# Patient Record
Sex: MALE
[De-identification: names, ages, dates, MRNs, and addresses within clinical notes are randomized per-mention and may not be internally consistent; named-entity substitution may affect disease eponyms.]

---

## 2017-08-18 ENCOUNTER — HOSPITAL ENCOUNTER (OUTPATIENT)
Dept: HOSPITAL 47 - RADUSWWP | Age: 32
Discharge: HOME | End: 2017-08-18
Attending: FAMILY MEDICINE
Payer: COMMERCIAL

## 2017-08-18 DIAGNOSIS — N20.0: Primary | ICD-10-CM

## 2017-08-18 DIAGNOSIS — N28.1: ICD-10-CM

## 2017-08-18 PROCEDURE — 76700 US EXAM ABDOM COMPLETE: CPT

## 2017-08-18 NOTE — US
EXAMINATION TYPE: US abdomen complete

 

DATE OF EXAM: 8/18/2017

 

COMPARISON: NONE

 

CLINICAL HISTORY: R74.8 ABN LIVER ENZYMES. Elevated liver enzymes

 

EXAM MEASUREMENTS:

 

Liver Length:  15.9 cm   

Gallbladder Wall:  0.2 cm   

CBD:  0.3 cm

Spleen:  12.3 cm   

Right Kidney:  11.7 x 6.0 x 5.5 cm 

Left Kidney:  11.6 x 5.8 x 5.5 cm   

 

 

 

Pancreas:  obscured by overlying midline bowel gas

Liver:  somewhat heterogeneous echogenicity, increased attenuation, scanned intercostally, limited by
 rib shadowing. This limits evaluation for underlying hepatic masses. 

Gallbladder:  wnl

**Evidence for sonographic Brink's sign:  no

CBD:  visualized portions wnl, limited by overlying bowel gas 

Spleen:  wnl   

Right Kidney:  2.7 x 2.3 x 1.8cm septated cystic area superior pole    

Left Kidney:  0.5cm echogenic focus inferior pole   

Upper IVC:  wnl  

Abd Aorta:  visualized portions wnl, proximal portion obscured by overlying midline bowel gas

 

The intrahepatic portion of the IVC and proximal abdominal aorta are within normal limits.  There is 
no evidence of cholelithiasis.  Common bile duct is unremarkable.  The visualized portions of the pan
creas are homogenous.  The spleen is unremarkable.  Kidneys are symmetric and free of hydronephrosis.
  No renal lesions are seen.

 

IMPRESSION: 

1. Heterogenous echogenicity of the liver, most commonly related to underlying hepatic steatosis.

2. 5 mm nonobstructing left lower pole renal calculus.

3. 2.7 cm right upper pole renal cyst containing an internal septation.

## 2017-11-17 ENCOUNTER — HOSPITAL ENCOUNTER (OUTPATIENT)
Dept: HOSPITAL 47 - LABWHC1 | Age: 32
Discharge: HOME | End: 2017-11-17
Attending: FAMILY MEDICINE
Payer: COMMERCIAL

## 2017-11-17 DIAGNOSIS — K76.0: Primary | ICD-10-CM

## 2017-11-17 LAB
ALP SERPL-CCNC: 74 U/L (ref 38–126)
ALT SERPL-CCNC: 68 U/L (ref 21–72)
AST SERPL-CCNC: 31 U/L (ref 17–59)
BILIRUBIN DIRECT+TOT PNL SERPL-MCNC: 0.3 MG/DL (ref 0–0.2)
PROT SERPL-MCNC: 7.3 G/DL (ref 6.3–8.2)

## 2017-11-17 PROCEDURE — 36415 COLL VENOUS BLD VENIPUNCTURE: CPT

## 2017-11-17 PROCEDURE — 80076 HEPATIC FUNCTION PANEL: CPT

## 2018-02-01 ENCOUNTER — HOSPITAL ENCOUNTER (OUTPATIENT)
Dept: HOSPITAL 47 - LABWHC1 | Age: 33
Discharge: HOME | End: 2018-02-01
Attending: FAMILY MEDICINE
Payer: COMMERCIAL

## 2018-02-01 DIAGNOSIS — E78.5: ICD-10-CM

## 2018-02-01 DIAGNOSIS — H34.231: Primary | ICD-10-CM

## 2018-02-01 LAB
ALBUMIN SERPL-MCNC: 4.5 G/DL (ref 3.5–5)
ALP SERPL-CCNC: 72 U/L (ref 38–126)
ALT SERPL-CCNC: 70 U/L (ref 21–72)
ANION GAP SERPL CALC-SCNC: 11 MMOL/L
AST SERPL-CCNC: 38 U/L (ref 17–59)
BASOPHILS # BLD AUTO: 0.1 K/UL (ref 0–0.2)
BASOPHILS NFR BLD AUTO: 1 %
BUN SERPL-SCNC: 16 MG/DL (ref 9–20)
CALCIUM SPEC-MCNC: 9.8 MG/DL (ref 8.4–10.2)
CHLORIDE SERPL-SCNC: 105 MMOL/L (ref 98–107)
CHOLEST SERPL-MCNC: 240 MG/DL (ref ?–200)
CO2 SERPL-SCNC: 25 MMOL/L (ref 22–30)
EOSINOPHIL # BLD AUTO: 0.1 K/UL (ref 0–0.7)
EOSINOPHIL NFR BLD AUTO: 2 %
ERYTHROCYTE [DISTWIDTH] IN BLOOD BY AUTOMATED COUNT: 5.6 M/UL (ref 4.3–5.9)
ERYTHROCYTE [DISTWIDTH] IN BLOOD: 14.7 % (ref 11.5–15.5)
GLUCOSE SERPL-MCNC: 97 MG/DL (ref 74–99)
HCT VFR BLD AUTO: 45.8 % (ref 39–53)
HDLC SERPL-MCNC: 53 MG/DL (ref 40–60)
HGB BLD-MCNC: 15.2 GM/DL (ref 13–17.5)
LDLC SERPL CALC-MCNC: 137 MG/DL (ref 0–99)
LYMPHOCYTES # SPEC AUTO: 2 K/UL (ref 1–4.8)
LYMPHOCYTES NFR SPEC AUTO: 28 %
MCH RBC QN AUTO: 27.2 PG (ref 25–35)
MCHC RBC AUTO-ENTMCNC: 33.2 G/DL (ref 31–37)
MCV RBC AUTO: 81.8 FL (ref 80–100)
MONOCYTES # BLD AUTO: 0.5 K/UL (ref 0–1)
MONOCYTES NFR BLD AUTO: 7 %
NEUTROPHILS # BLD AUTO: 4.3 K/UL (ref 1.3–7.7)
NEUTROPHILS NFR BLD AUTO: 61 %
PLATELET # BLD AUTO: 216 K/UL (ref 150–450)
POTASSIUM SERPL-SCNC: 4.4 MMOL/L (ref 3.5–5.1)
PROT SERPL-MCNC: 7.6 G/DL (ref 6.3–8.2)
SODIUM SERPL-SCNC: 141 MMOL/L (ref 137–145)
TRIGL SERPL-MCNC: 251 MG/DL (ref ?–150)
WBC # BLD AUTO: 7 K/UL (ref 3.8–10.6)

## 2018-02-01 PROCEDURE — 86141 C-REACTIVE PROTEIN HS: CPT

## 2018-02-01 PROCEDURE — 80053 COMPREHEN METABOLIC PANEL: CPT

## 2018-02-01 PROCEDURE — 36415 COLL VENOUS BLD VENIPUNCTURE: CPT

## 2018-02-01 PROCEDURE — 80061 LIPID PANEL: CPT

## 2018-02-01 PROCEDURE — 85025 COMPLETE CBC W/AUTO DIFF WBC: CPT

## 2018-02-01 PROCEDURE — 84443 ASSAY THYROID STIM HORMONE: CPT

## 2018-02-01 PROCEDURE — 85652 RBC SED RATE AUTOMATED: CPT

## 2018-05-25 ENCOUNTER — HOSPITAL ENCOUNTER (OUTPATIENT)
Dept: HOSPITAL 47 - LABWHC1 | Age: 33
Discharge: HOME | End: 2018-05-25
Attending: FAMILY MEDICINE
Payer: COMMERCIAL

## 2018-05-25 DIAGNOSIS — K76.0: ICD-10-CM

## 2018-05-25 DIAGNOSIS — E78.5: Primary | ICD-10-CM

## 2018-05-25 LAB
ALBUMIN SERPL-MCNC: 4.5 G/DL (ref 3.5–5)
ALP SERPL-CCNC: 76 U/L (ref 38–126)
ALT SERPL-CCNC: 99 U/L (ref 21–72)
AST SERPL-CCNC: 51 U/L (ref 17–59)
BILIRUB INDIRECT SERPL-MCNC: 1.4 MG/DL (ref 0–1.1)
BILIRUBIN DIRECT+TOT PNL SERPL-MCNC: 0.3 MG/DL (ref 0–0.2)
CHOLEST SERPL-MCNC: 152 MG/DL (ref ?–200)
CK SERPL-CCNC: 172 U/L (ref 55–170)
HDLC SERPL-MCNC: 59 MG/DL (ref 40–60)
LDLC SERPL CALC-MCNC: 69 MG/DL (ref 0–99)
PROT SERPL-MCNC: 7.3 G/DL (ref 6.3–8.2)
TRIGL SERPL-MCNC: 120 MG/DL (ref ?–150)

## 2018-05-25 PROCEDURE — 36415 COLL VENOUS BLD VENIPUNCTURE: CPT

## 2018-05-25 PROCEDURE — 80061 LIPID PANEL: CPT

## 2018-05-25 PROCEDURE — 82550 ASSAY OF CK (CPK): CPT

## 2018-05-25 PROCEDURE — 80076 HEPATIC FUNCTION PANEL: CPT

## 2018-09-12 ENCOUNTER — HOSPITAL ENCOUNTER (OUTPATIENT)
Dept: HOSPITAL 47 - LABWHC1 | Age: 33
End: 2018-09-12
Attending: FAMILY MEDICINE
Payer: COMMERCIAL

## 2018-09-12 DIAGNOSIS — K76.0: Primary | ICD-10-CM

## 2018-09-12 LAB
ALBUMIN SERPL-MCNC: 4.3 G/DL (ref 3.5–5)
ALP SERPL-CCNC: 68 U/L (ref 38–126)
ALT SERPL-CCNC: 77 U/L (ref 21–72)
AST SERPL-CCNC: 37 U/L (ref 17–59)
BILIRUB INDIRECT SERPL-MCNC: 1.4 MG/DL (ref 0–1.1)
BILIRUBIN DIRECT+TOT PNL SERPL-MCNC: 0.2 MG/DL (ref 0–0.2)
PROT SERPL-MCNC: 7.2 G/DL (ref 6.3–8.2)

## 2018-09-12 PROCEDURE — 36415 COLL VENOUS BLD VENIPUNCTURE: CPT

## 2018-09-12 PROCEDURE — 80076 HEPATIC FUNCTION PANEL: CPT

## 2018-12-14 ENCOUNTER — HOSPITAL ENCOUNTER (OUTPATIENT)
Dept: HOSPITAL 47 - LABWHC1 | Age: 33
End: 2018-12-14
Attending: FAMILY MEDICINE
Payer: COMMERCIAL

## 2018-12-14 DIAGNOSIS — R74.8: Primary | ICD-10-CM

## 2018-12-14 LAB
ALBUMIN SERPL-MCNC: 4.6 G/DL (ref 3.8–4.9)
ALBUMIN/GLOB SERPL: 2.19 G/DL (ref 1.2–2.1)
ALP SERPL-CCNC: 85 U/L (ref 41–126)
ALT SERPL-CCNC: 140 U/L (ref 10–49)
AST SERPL-CCNC: 52 U/L (ref 14–35)
BILIRUB INDIRECT SERPL-MCNC: 1 MG/DL
GLOBULIN SER CALC-MCNC: 2.1 G/DL (ref 2.1–3.7)
PROT SERPL-MCNC: 6.7 G/DL (ref 6.2–8.2)

## 2018-12-14 PROCEDURE — 36415 COLL VENOUS BLD VENIPUNCTURE: CPT

## 2018-12-14 PROCEDURE — 80076 HEPATIC FUNCTION PANEL: CPT

## 2019-03-08 ENCOUNTER — HOSPITAL ENCOUNTER (OUTPATIENT)
Dept: HOSPITAL 47 - LABWHC1 | Age: 34
End: 2019-03-08
Attending: FAMILY MEDICINE
Payer: COMMERCIAL

## 2019-03-08 DIAGNOSIS — K76.0: Primary | ICD-10-CM

## 2019-03-08 LAB
ALBUMIN SERPL-MCNC: 4.6 G/DL (ref 3.8–4.9)
ALBUMIN/GLOB SERPL: 2 G/DL (ref 1.6–3.17)
ALP SERPL-CCNC: 82 U/L (ref 41–126)
ALT SERPL-CCNC: 85 U/L (ref 10–49)
AST SERPL-CCNC: 36 U/L (ref 14–35)
BASOPHILS # BLD AUTO: 0.1 K/UL (ref 0–0.2)
BASOPHILS NFR BLD AUTO: 1 %
BILIRUB INDIRECT SERPL-MCNC: 1.2 MG/DL
CHOLEST SERPL-MCNC: 166 MG/DL (ref 0–200)
EOSINOPHIL # BLD AUTO: 0.2 K/UL (ref 0–0.7)
EOSINOPHIL NFR BLD AUTO: 3 %
ERYTHROCYTE [DISTWIDTH] IN BLOOD BY AUTOMATED COUNT: 5.64 M/UL (ref 4.3–5.9)
ERYTHROCYTE [DISTWIDTH] IN BLOOD: 13.2 % (ref 11.5–15.5)
GGT SERPL-CCNC: 98 U/L (ref 0–73)
GLOBULIN SER CALC-MCNC: 2.3 G/DL (ref 1.6–3.3)
HCT VFR BLD AUTO: 46.6 % (ref 39–53)
HDLC SERPL-MCNC: 48 MG/DL (ref 40–60)
HGB BLD-MCNC: 15.5 GM/DL (ref 13–17.5)
LDLC SERPL CALC-MCNC: 77.2 MG/DL (ref 0–131)
LYMPHOCYTES # SPEC AUTO: 2.4 K/UL (ref 1–4.8)
LYMPHOCYTES NFR SPEC AUTO: 37 %
MCH RBC QN AUTO: 27.4 PG (ref 25–35)
MCHC RBC AUTO-ENTMCNC: 33.2 G/DL (ref 31–37)
MCV RBC AUTO: 82.7 FL (ref 80–100)
MONOCYTES # BLD AUTO: 0.4 K/UL (ref 0–1)
MONOCYTES NFR BLD AUTO: 7 %
NEUTROPHILS # BLD AUTO: 3.3 K/UL (ref 1.3–7.7)
NEUTROPHILS NFR BLD AUTO: 51 %
PLATELET # BLD AUTO: 231 K/UL (ref 150–450)
PROT SERPL-MCNC: 6.9 G/DL (ref 6.2–8.2)
TRIGL SERPL-MCNC: 204 MG/DL (ref 0–149)
VLDLC SERPL CALC-MCNC: 40.8 MG/DL (ref 5–40)
WBC # BLD AUTO: 6.5 K/UL (ref 3.8–10.6)

## 2019-03-08 PROCEDURE — 80076 HEPATIC FUNCTION PANEL: CPT

## 2019-03-08 PROCEDURE — 36415 COLL VENOUS BLD VENIPUNCTURE: CPT

## 2019-03-08 PROCEDURE — 82977 ASSAY OF GGT: CPT

## 2019-03-08 PROCEDURE — 86141 C-REACTIVE PROTEIN HS: CPT

## 2019-03-08 PROCEDURE — 80061 LIPID PANEL: CPT

## 2019-03-08 PROCEDURE — 85025 COMPLETE CBC W/AUTO DIFF WBC: CPT

## 2019-12-19 ENCOUNTER — HOSPITAL ENCOUNTER (OUTPATIENT)
Dept: HOSPITAL 47 - LABWHC1 | Age: 34
Discharge: HOME | End: 2019-12-19
Attending: FAMILY MEDICINE
Payer: COMMERCIAL

## 2019-12-19 DIAGNOSIS — K76.0: Primary | ICD-10-CM

## 2019-12-19 LAB
ALBUMIN SERPL-MCNC: 4.6 G/DL (ref 3.8–4.9)
ALBUMIN/GLOB SERPL: 2.42 G/DL (ref 1.6–3.17)
ALP SERPL-CCNC: 75 U/L (ref 41–126)
ALT SERPL-CCNC: 68 U/L (ref 10–49)
AST SERPL-CCNC: 41 U/L (ref 14–35)
BILIRUB INDIRECT SERPL-MCNC: 1 MG/DL
GGT SERPL-CCNC: 54 U/L (ref 0–73)
GLOBULIN SER CALC-MCNC: 1.9 G/DL (ref 1.6–3.3)
PROT SERPL-MCNC: 6.5 G/DL (ref 6.2–8.2)

## 2019-12-19 PROCEDURE — 82977 ASSAY OF GGT: CPT

## 2019-12-19 PROCEDURE — 36415 COLL VENOUS BLD VENIPUNCTURE: CPT

## 2019-12-19 PROCEDURE — 80076 HEPATIC FUNCTION PANEL: CPT

## 2021-11-10 ENCOUNTER — HOSPITAL ENCOUNTER (OUTPATIENT)
Dept: HOSPITAL 47 - RADUSWWP | Age: 36
Discharge: HOME | End: 2021-11-10
Attending: FAMILY MEDICINE
Payer: COMMERCIAL

## 2021-11-10 DIAGNOSIS — N28.1: ICD-10-CM

## 2021-11-10 DIAGNOSIS — R16.0: Primary | ICD-10-CM

## 2021-11-10 PROCEDURE — 76705 ECHO EXAM OF ABDOMEN: CPT

## 2021-11-10 NOTE — US
EXAMINATION TYPE: US liver

 

DATE OF EXAM: 11/10/2021

 

COMPARISON: 8/18/2017

 

CLINICAL HISTORY: 36-year-old male R17 Unspecified jaundice. Abnormal labs

 

TECHNIQUE: Multiple sonographic images of the right upper quadrant are obtained.

 

FINDINGS:

 

EXAM MEASUREMENTS:

 

Liver Length:  18.5 cm   

Gallbladder Wall:  0.2 cm   

CBD:  0.3 cm

Right Kidney:  11.5 x 6.6 x 6.0 cm

 

 

Pancreas:  Mostly obscured by bowel gas shadowing. Only a small portion of the pancreatic body is see
n.

Liver: Relatively homogeneous appearance. Mildly enlarged. No focal lesion seen.

Gallbladder:  No stones seen. No hydropic change or surrounding fluid.

**Evidence for sonographic Brink's sign:  No

CBD:  wnl 

Right Kidney:  No hydronephrosis. Complex septated cyst at the upper pole of the right kidney measuri
ng 2.7 x 2.1 x 1.6 cm (versus 2.7 x 2.3 x 1.8 cm back in 2017).

 

 

 

IMPRESSION: 

1. Mild hepatomegaly (18.5 cm). Overall parenchymal echotexture appears normal.

2. No gallstones or biliary ductal dilatation.

3. Complex septated cyst upper pole right kidney measures 2.7 x 2.1 cm (versus 2.7 x 2.3 cm back in 2
017). Continue annual surveillance ultrasound recommended.